# Patient Record
Sex: MALE
[De-identification: names, ages, dates, MRNs, and addresses within clinical notes are randomized per-mention and may not be internally consistent; named-entity substitution may affect disease eponyms.]

---

## 2022-02-16 ENCOUNTER — NURSE TRIAGE (OUTPATIENT)
Dept: OTHER | Facility: CLINIC | Age: 62
End: 2022-02-16

## 2022-02-16 NOTE — TELEPHONE ENCOUNTER
Subjective: Caller states \"Within in the last couple days he is disoriented and confused. \"     Current Symptoms: Chronic cough, disorientation, loss of coordination, fatigue, double vision, headache    Onset: Yesterday     Associated Symptoms: NA    Pain Severity: Denies pain      Temperature: Denies fever and feeling feverish/chills    What has been tried: Advil    LMP: NA Pregnant: NA    Recommended disposition: Go to ED now- caller states they live on [place] and would have to be air lifted. Advised calling 911 to have someone evaluate patient. Caller agrees to plan. Care advice provided, patient verbalizes understanding; denies any other questions or concerns; instructed to call back for any new or worsening symptoms. Patient/caller agrees to calling 911    This triage is a result of a call to [address] [place] [address]. Please do not respond to the triage nurse through this encounter. Any subsequent communication should be directly with the patient.     Reason for Disposition   Headache or vomiting    Protocols used: CONFUSION - DELIRIUM-ADULT-